# Patient Record
Sex: FEMALE | Race: WHITE | Employment: FULL TIME | URBAN - METROPOLITAN AREA
[De-identification: names, ages, dates, MRNs, and addresses within clinical notes are randomized per-mention and may not be internally consistent; named-entity substitution may affect disease eponyms.]

---

## 2023-05-12 ENCOUNTER — LAB (OUTPATIENT)
Dept: LAB | Facility: LAB | Age: 38
End: 2023-05-12
Payer: COMMERCIAL

## 2023-05-12 ENCOUNTER — OFFICE VISIT (OUTPATIENT)
Dept: PRIMARY CARE | Facility: CLINIC | Age: 38
End: 2023-05-12
Payer: COMMERCIAL

## 2023-05-12 VITALS
SYSTOLIC BLOOD PRESSURE: 118 MMHG | WEIGHT: 154.8 LBS | HEART RATE: 101 BPM | BODY MASS INDEX: 27.21 KG/M2 | DIASTOLIC BLOOD PRESSURE: 79 MMHG

## 2023-05-12 DIAGNOSIS — F32.A ANXIETY AND DEPRESSION: ICD-10-CM

## 2023-05-12 DIAGNOSIS — F41.9 ANXIETY AND DEPRESSION: ICD-10-CM

## 2023-05-12 DIAGNOSIS — F41.9 ANXIETY AND DEPRESSION: Primary | ICD-10-CM

## 2023-05-12 DIAGNOSIS — F32.A ANXIETY AND DEPRESSION: Primary | ICD-10-CM

## 2023-05-12 PROBLEM — K06.010: Status: RESOLVED | Noted: 2023-01-05 | Resolved: 2023-05-12

## 2023-05-12 PROBLEM — G90.1 DYSAUTONOMIA (MULTI): Status: RESOLVED | Noted: 2018-02-17 | Resolved: 2023-05-12

## 2023-05-12 PROBLEM — J38.3 VOCAL CORD DYSFUNCTION: Status: RESOLVED | Noted: 2023-05-12 | Resolved: 2023-05-12

## 2023-05-12 PROBLEM — K59.09 CHRONIC CONSTIPATION: Status: ACTIVE | Noted: 2023-05-12

## 2023-05-12 PROBLEM — J30.2 SEASONAL ALLERGIES: Status: RESOLVED | Noted: 2023-05-12 | Resolved: 2023-05-12

## 2023-05-12 PROBLEM — J45.909 ASTHMA (HHS-HCC): Status: ACTIVE | Noted: 2019-01-21

## 2023-05-12 PROBLEM — D68.1 FACTOR XI DEFICIENCY (MULTI): Status: RESOLVED | Noted: 2020-07-21 | Resolved: 2023-05-12

## 2023-05-12 PROBLEM — L91.0 SCARRING, HYPERTROPHIC: Status: RESOLVED | Noted: 2017-07-23 | Resolved: 2023-05-12

## 2023-05-12 PROBLEM — R06.83 SNORING: Status: RESOLVED | Noted: 2023-05-12 | Resolved: 2023-05-12

## 2023-05-12 PROBLEM — R22.32 MASS OF LEFT UPPER EXTREMITY: Status: RESOLVED | Noted: 2023-05-12 | Resolved: 2023-05-12

## 2023-05-12 PROBLEM — J30.9 ALLERGIC RHINITIS, MILD: Status: ACTIVE | Noted: 2023-05-12

## 2023-05-12 PROBLEM — Z14.8 TAY-SACHS CARRIER: Status: ACTIVE | Noted: 2020-07-21

## 2023-05-12 PROBLEM — R55 VASOVAGAL SYNCOPE: Status: RESOLVED | Noted: 2017-08-17 | Resolved: 2023-05-12

## 2023-05-12 PROBLEM — U07.1 COVID-19 VIRUS INFECTION: Status: RESOLVED | Noted: 2023-05-12 | Resolved: 2023-05-12

## 2023-05-12 PROBLEM — D56.0: Status: ACTIVE | Noted: 2020-07-21

## 2023-05-12 PROBLEM — R10.13 DYSPEPSIA: Status: ACTIVE | Noted: 2023-05-12

## 2023-05-12 PROBLEM — M65.319 ACQUIRED TRIGGER THUMB: Status: ACTIVE | Noted: 2023-05-12

## 2023-05-12 PROBLEM — F98.8 ADD (ATTENTION DEFICIT DISORDER): Status: ACTIVE | Noted: 2023-05-12

## 2023-05-12 PROBLEM — K21.9 GASTROESOPHAGEAL REFLUX DISEASE: Status: ACTIVE | Noted: 2017-08-17

## 2023-05-12 LAB
ANION GAP IN SER/PLAS: 12 MMOL/L (ref 10–20)
BASOPHILS (10*3/UL) IN BLOOD BY AUTOMATED COUNT: 0.08 X10E9/L (ref 0–0.1)
BASOPHILS/100 LEUKOCYTES IN BLOOD BY AUTOMATED COUNT: 1.1 % (ref 0–2)
CALCIUM (MG/DL) IN SER/PLAS: 10.1 MG/DL (ref 8.6–10.6)
CARBON DIOXIDE, TOTAL (MMOL/L) IN SER/PLAS: 26 MMOL/L (ref 21–32)
CHLORIDE (MMOL/L) IN SER/PLAS: 104 MMOL/L (ref 98–107)
CREATININE (MG/DL) IN SER/PLAS: 0.68 MG/DL (ref 0.5–1.05)
EOSINOPHILS (10*3/UL) IN BLOOD BY AUTOMATED COUNT: 0.18 X10E9/L (ref 0–0.7)
EOSINOPHILS/100 LEUKOCYTES IN BLOOD BY AUTOMATED COUNT: 2.5 % (ref 0–6)
ERYTHROCYTE DISTRIBUTION WIDTH (RATIO) BY AUTOMATED COUNT: 14 % (ref 11.5–14.5)
ERYTHROCYTE MEAN CORPUSCULAR HEMOGLOBIN CONCENTRATION (G/DL) BY AUTOMATED: 32.7 G/DL (ref 32–36)
ERYTHROCYTE MEAN CORPUSCULAR VOLUME (FL) BY AUTOMATED COUNT: 83 FL (ref 80–100)
ERYTHROCYTES (10*6/UL) IN BLOOD BY AUTOMATED COUNT: 5.17 X10E12/L (ref 4–5.2)
GFR FEMALE: >90 ML/MIN/1.73M2
GLUCOSE (MG/DL) IN SER/PLAS: 79 MG/DL (ref 74–99)
HEMATOCRIT (%) IN BLOOD BY AUTOMATED COUNT: 42.8 % (ref 36–46)
HEMOGLOBIN (G/DL) IN BLOOD: 14 G/DL (ref 12–16)
IMMATURE GRANULOCYTES/100 LEUKOCYTES IN BLOOD BY AUTOMATED COUNT: 0.3 % (ref 0–0.9)
LEUKOCYTES (10*3/UL) IN BLOOD BY AUTOMATED COUNT: 7.2 X10E9/L (ref 4.4–11.3)
LYMPHOCYTES (10*3/UL) IN BLOOD BY AUTOMATED COUNT: 3.16 X10E9/L (ref 1.2–4.8)
LYMPHOCYTES/100 LEUKOCYTES IN BLOOD BY AUTOMATED COUNT: 44.1 % (ref 13–44)
MONOCYTES (10*3/UL) IN BLOOD BY AUTOMATED COUNT: 0.42 X10E9/L (ref 0.1–1)
MONOCYTES/100 LEUKOCYTES IN BLOOD BY AUTOMATED COUNT: 5.9 % (ref 2–10)
NEUTROPHILS (10*3/UL) IN BLOOD BY AUTOMATED COUNT: 3.31 X10E9/L (ref 1.2–7.7)
NEUTROPHILS/100 LEUKOCYTES IN BLOOD BY AUTOMATED COUNT: 46.1 % (ref 40–80)
NRBC (PER 100 WBCS) BY AUTOMATED COUNT: 0 /100 WBC (ref 0–0)
PLATELETS (10*3/UL) IN BLOOD AUTOMATED COUNT: 302 X10E9/L (ref 150–450)
POTASSIUM (MMOL/L) IN SER/PLAS: 4.4 MMOL/L (ref 3.5–5.3)
SODIUM (MMOL/L) IN SER/PLAS: 138 MMOL/L (ref 136–145)
UREA NITROGEN (MG/DL) IN SER/PLAS: 14 MG/DL (ref 6–23)

## 2023-05-12 PROCEDURE — 99214 OFFICE O/P EST MOD 30 MIN: CPT | Performed by: INTERNAL MEDICINE

## 2023-05-12 PROCEDURE — 1036F TOBACCO NON-USER: CPT | Performed by: INTERNAL MEDICINE

## 2023-05-12 PROCEDURE — 84443 ASSAY THYROID STIM HORMONE: CPT

## 2023-05-12 PROCEDURE — 85025 COMPLETE CBC W/AUTO DIFF WBC: CPT

## 2023-05-12 PROCEDURE — 80048 BASIC METABOLIC PNL TOTAL CA: CPT

## 2023-05-12 PROCEDURE — 36415 COLL VENOUS BLD VENIPUNCTURE: CPT

## 2023-05-12 RX ORDER — PANTOPRAZOLE SODIUM 40 MG/1
TABLET, DELAYED RELEASE ORAL
COMMUNITY
Start: 2020-08-31

## 2023-05-12 RX ORDER — MONTELUKAST SODIUM 10 MG/1
1 TABLET ORAL NIGHTLY
COMMUNITY
Start: 2021-06-01 | End: 2023-11-08 | Stop reason: SDUPTHER

## 2023-05-12 RX ORDER — AZELASTINE HCL 205.5 UG/1
SPRAY NASAL
COMMUNITY
Start: 2021-03-23

## 2023-05-12 RX ORDER — ALBUTEROL SULFATE 0.83 MG/ML
2.5 SOLUTION RESPIRATORY (INHALATION) EVERY 4 HOURS PRN
COMMUNITY
Start: 2018-11-16

## 2023-05-12 RX ORDER — ESCITALOPRAM OXALATE 10 MG/1
10 TABLET ORAL DAILY
Qty: 30 TABLET | Refills: 2 | Status: SHIPPED | OUTPATIENT
Start: 2023-05-12 | End: 2023-06-07 | Stop reason: SDUPTHER

## 2023-05-12 RX ORDER — NORETHINDRONE ACETATE AND ETHINYL ESTRADIOL 1; 20 MG/1; UG/1
TABLET ORAL
COMMUNITY
Start: 2022-12-05

## 2023-05-12 RX ORDER — ALBUTEROL SULFATE 90 UG/1
AEROSOL, METERED RESPIRATORY (INHALATION)
COMMUNITY
Start: 2020-10-16

## 2023-05-12 RX ORDER — FLUTICASONE PROPIONATE 50 MCG
SPRAY, SUSPENSION (ML) NASAL
COMMUNITY

## 2023-05-12 RX ORDER — IPRATROPIUM BROMIDE 42 UG/1
2 SPRAY, METERED NASAL 3 TIMES DAILY
COMMUNITY
Start: 2023-03-21 | End: 2023-11-08 | Stop reason: WASHOUT

## 2023-05-12 RX ORDER — BUDESONIDE AND FORMOTEROL FUMARATE DIHYDRATE 80; 4.5 UG/1; UG/1
2 AEROSOL RESPIRATORY (INHALATION) 2 TIMES DAILY
COMMUNITY
End: 2023-11-08 | Stop reason: WASHOUT

## 2023-05-12 RX ORDER — FLUTICASONE FUROATE AND VILANTEROL 100; 25 UG/1; UG/1
POWDER RESPIRATORY (INHALATION)
COMMUNITY
Start: 2021-05-04 | End: 2023-11-08 | Stop reason: WASHOUT

## 2023-05-12 ASSESSMENT — ENCOUNTER SYMPTOMS
NERVOUS/ANXIOUS: 1
PALPITATIONS: 0
ACTIVITY CHANGE: 0
HEADACHES: 0
DIZZINESS: 0
AGITATION: 0
CONFUSION: 0
HALLUCINATIONS: 0
CHEST TIGHTNESS: 0
WHEEZING: 0
COUGH: 0
DYSPHORIC MOOD: 1
FEVER: 0
DECREASED CONCENTRATION: 1
SLEEP DISTURBANCE: 1
FATIGUE: 1
SHORTNESS OF BREATH: 0

## 2023-05-12 NOTE — PROGRESS NOTES
Antonio Marie comes in today for worsening situational stressors.      Antonio comes in today with acute worsening of anxiety and depression.  This has been stemming over the past year or so.  She has thought several times about coming in to discuss possibility of getting started on medication therapy.  She has opted not to, but recently there was another family death triggered a worsening of her symptoms.  Her father passed away about 18 months ago.  She delivered a  shortly after and had some emotional ups and downs.  She is working through a psychotherapist and is trying to manage her symptoms.  Her nephew suddenly passed away of AML a few weeks ago and this has really triggered an increase in anxiety and depression.  She has had a longstanding history of this in the past and has been on medications.  When she was in high school, she was on SSRI therapy.  Later in life, she was on a combination of Wellbutrin and Cymbalta.  This actually worked well for her, but the Cymbalta did have significant withdrawal effects when she stopped taking this, so she would opt not to go back to this combination.  She is having some panic moments, checking on her  regularly especially triggered after her nephew's death.  She has no suicidal thoughts nor ideation nor thoughts of harming others, but she is finding that this is debilitating to the point that she sometimes is unable to go to work nor complete her routine household activities/tasks.  She is here to reach out for help and also is requesting some updated blood work to ensure that there is not a physical component contributing.        Review of Systems   Constitutional:  Positive for fatigue. Negative for activity change and fever.   Respiratory:  Negative for cough, chest tightness, shortness of breath and wheezing.    Cardiovascular:  Negative for chest pain and palpitations.   Neurological:  Negative for dizziness and headaches.   Psychiatric/Behavioral:   Positive for behavioral problems, decreased concentration, dysphoric mood and sleep disturbance. Negative for agitation, confusion, hallucinations, self-injury and suicidal ideas. The patient is nervous/anxious.        Objective   Physical Exam  Constitutional:       Appearance: Normal appearance.   Neurological:      Mental Status: She is alert.   Psychiatric:         Mood and Affect: Mood normal.         Behavior: Behavior normal.         Thought Content: Thought content normal.         Judgment: Judgment normal.         Assessment/Plan   1.  Acute anxiety and depression: This has been triggered by several situational stressors.  We have discussed at length.  She continues to work with psychotherapy which is an excellent course.  We also discussed starting on medication therapy, ideally SSRI therapy.  We will start with escitalopram, starting at 5 mg daily increasing to 10 mg daily as tolerated.  Have discussed side effect profile and method of action.  She will keep us updated on how she is responding and continue working with her psychotherapist.  She is to call with any additional questions or concerns.    Greater than 30 minutes was spent with the patient today discussing chart review, current symptomatology, treatment options, and documentation, greater than 50% of time spent on care coordination.    Problem List Items Addressed This Visit       Anxiety and depression - Primary

## 2023-05-13 LAB — THYROTROPIN (MIU/L) IN SER/PLAS BY DETECTION LIMIT <= 0.05 MIU/L: 1.3 MIU/L (ref 0.44–3.98)

## 2023-06-07 DIAGNOSIS — F41.9 ANXIETY AND DEPRESSION: Primary | ICD-10-CM

## 2023-06-07 DIAGNOSIS — F32.A ANXIETY AND DEPRESSION: Primary | ICD-10-CM

## 2023-06-07 RX ORDER — ESCITALOPRAM OXALATE 10 MG/1
10 TABLET ORAL DAILY
Qty: 90 TABLET | Refills: 0 | Status: SHIPPED | OUTPATIENT
Start: 2023-06-07 | End: 2023-09-15 | Stop reason: SDUPTHER

## 2023-09-15 DIAGNOSIS — F32.A ANXIETY AND DEPRESSION: Primary | ICD-10-CM

## 2023-09-15 DIAGNOSIS — F41.9 ANXIETY AND DEPRESSION: Primary | ICD-10-CM

## 2023-09-15 RX ORDER — ESCITALOPRAM OXALATE 10 MG/1
10 TABLET ORAL DAILY
Qty: 90 TABLET | Refills: 0 | Status: SHIPPED | OUTPATIENT
Start: 2023-09-15 | End: 2023-11-08 | Stop reason: SDUPTHER

## 2023-10-24 ENCOUNTER — APPOINTMENT (OUTPATIENT)
Dept: PRIMARY CARE | Facility: CLINIC | Age: 38
End: 2023-10-24
Payer: COMMERCIAL

## 2023-11-08 ENCOUNTER — OFFICE VISIT (OUTPATIENT)
Dept: PRIMARY CARE | Facility: CLINIC | Age: 38
End: 2023-11-08
Payer: COMMERCIAL

## 2023-11-08 VITALS
HEIGHT: 63 IN | SYSTOLIC BLOOD PRESSURE: 108 MMHG | DIASTOLIC BLOOD PRESSURE: 73 MMHG | BODY MASS INDEX: 28.63 KG/M2 | HEART RATE: 75 BPM | WEIGHT: 161.6 LBS

## 2023-11-08 DIAGNOSIS — J30.9 ALLERGIC RHINITIS, MILD: ICD-10-CM

## 2023-11-08 DIAGNOSIS — F32.A ANXIETY AND DEPRESSION: ICD-10-CM

## 2023-11-08 DIAGNOSIS — F41.9 ANXIETY AND DEPRESSION: ICD-10-CM

## 2023-11-08 DIAGNOSIS — J45.20 MILD INTERMITTENT ASTHMA WITHOUT COMPLICATION (HHS-HCC): ICD-10-CM

## 2023-11-08 DIAGNOSIS — Z00.00 ROUTINE GENERAL MEDICAL EXAMINATION AT A HEALTH CARE FACILITY: Primary | ICD-10-CM

## 2023-11-08 DIAGNOSIS — Z23 NEED FOR INFLUENZA VACCINATION: ICD-10-CM

## 2023-11-08 PROCEDURE — 90471 IMMUNIZATION ADMIN: CPT | Performed by: INTERNAL MEDICINE

## 2023-11-08 PROCEDURE — 99395 PREV VISIT EST AGE 18-39: CPT | Performed by: INTERNAL MEDICINE

## 2023-11-08 PROCEDURE — 90686 IIV4 VACC NO PRSV 0.5 ML IM: CPT | Performed by: INTERNAL MEDICINE

## 2023-11-08 PROCEDURE — 1036F TOBACCO NON-USER: CPT | Performed by: INTERNAL MEDICINE

## 2023-11-08 RX ORDER — MONTELUKAST SODIUM 10 MG/1
10 TABLET ORAL NIGHTLY
Qty: 90 TABLET | Refills: 3 | Status: SHIPPED | OUTPATIENT
Start: 2023-11-08

## 2023-11-08 RX ORDER — ESCITALOPRAM OXALATE 10 MG/1
10 TABLET ORAL DAILY
Qty: 90 TABLET | Refills: 3 | Status: SHIPPED | OUTPATIENT
Start: 2023-11-08 | End: 2024-11-02

## 2023-11-08 ASSESSMENT — ENCOUNTER SYMPTOMS
APNEA: 1
MYALGIAS: 0
DIARRHEA: 0
LIGHT-HEADEDNESS: 0
WHEEZING: 0
WEAKNESS: 0
ARTHRALGIAS: 0
NECK PAIN: 0
SHORTNESS OF BREATH: 0
ADENOPATHY: 0
PALPITATIONS: 0
CONSTIPATION: 0
HEADACHES: 0
EYE ITCHING: 0
CHEST TIGHTNESS: 0
NERVOUS/ANXIOUS: 0
DECREASED CONCENTRATION: 0
JOINT SWELLING: 0
TREMORS: 0
SLEEP DISTURBANCE: 0
DIAPHORESIS: 0
DIFFICULTY URINATING: 0
COUGH: 0
CHILLS: 0
FATIGUE: 0
BACK PAIN: 0
ABDOMINAL DISTENTION: 0
HYPERACTIVE: 0
NUMBNESS: 0
COLOR CHANGE: 0
VOICE CHANGE: 0
SINUS PAIN: 0
RHINORRHEA: 0
ACTIVITY CHANGE: 0
SORE THROAT: 0
TROUBLE SWALLOWING: 0
AGITATION: 1
NAUSEA: 0
NECK STIFFNESS: 0
DIZZINESS: 0
FREQUENCY: 0
FLANK PAIN: 0
DYSPHORIC MOOD: 0
DYSURIA: 0
EYE DISCHARGE: 0
BRUISES/BLEEDS EASILY: 0
FEVER: 0
SINUS PRESSURE: 0
VOMITING: 0
ABDOMINAL PAIN: 0
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
## 2024-01-12 ENCOUNTER — LAB REQUISITION (OUTPATIENT)
Dept: LAB | Facility: HOSPITAL | Age: 39
End: 2024-01-12
Payer: COMMERCIAL

## 2024-01-12 PROCEDURE — 87086 URINE CULTURE/COLONY COUNT: CPT

## 2024-01-14 LAB — BACTERIA UR CULT: NO GROWTH
